# Patient Record
Sex: MALE | Race: OTHER | URBAN - METROPOLITAN AREA
[De-identification: names, ages, dates, MRNs, and addresses within clinical notes are randomized per-mention and may not be internally consistent; named-entity substitution may affect disease eponyms.]

---

## 2021-03-31 ENCOUNTER — TELEPHONE (OUTPATIENT)
Dept: OTOLARYNGOLOGY | Facility: CLINIC | Age: 51
End: 2021-03-31

## 2021-03-31 DIAGNOSIS — R42 DIZZINESS: Primary | ICD-10-CM

## 2022-03-16 ENCOUNTER — TELEPHONE (OUTPATIENT)
Dept: OTOLARYNGOLOGY | Facility: CLINIC | Age: 52
End: 2022-03-16
Payer: COMMERCIAL

## 2022-03-16 DIAGNOSIS — R42 VERTIGO: Primary | ICD-10-CM

## 2022-03-16 NOTE — TELEPHONE ENCOUNTER
Contacted and spoke to pt's wife regarding scheduled appt for this afternoon, after having spoken to  this morning. Let wife know that I will fax over referral from our clinic to Dr. Oscar Pink's clinic so pt may be scheduled for a dizziness evaluation. Pt's wife also spoke to provider for reassurance. Will be faxed PCP referral and ENT referral to Dr. Pink later today.

## 2022-03-16 NOTE — TELEPHONE ENCOUNTER
"Incoming call from pt via call center. Pt was having questions regarding whether this ENT appt was the one that he needed to come to, since he has be referred out so many times in the past before. Pt stated that he was supposed to go to the "[facility] center," to which I let pt know that the office he is looking for is located in both [place] and [place].     Pt didn't know if he should keep his appt for this afternoon, or just cancel. Let him know that I will call him back to let him know. Pt confirmed .  "